# Patient Record
Sex: FEMALE | Race: WHITE | URBAN - METROPOLITAN AREA
[De-identification: names, ages, dates, MRNs, and addresses within clinical notes are randomized per-mention and may not be internally consistent; named-entity substitution may affect disease eponyms.]

---

## 2022-01-04 ENCOUNTER — TELEPHONE (OUTPATIENT)
Dept: ENT CLINIC | Age: 67
End: 2022-01-04

## 2022-01-04 NOTE — TELEPHONE ENCOUNTER
Tried to call [facility] Parents to confirm next appointment, unable to reach pt due to phone number being disconnected

## 2022-01-05 ENCOUNTER — OFFICE VISIT (OUTPATIENT)
Dept: ENT CLINIC | Age: 67
End: 2022-01-05
Payer: COMMERCIAL

## 2022-01-05 VITALS
RESPIRATION RATE: 17 BRPM | DIASTOLIC BLOOD PRESSURE: 82 MMHG | HEART RATE: 80 BPM | TEMPERATURE: 97.1 F | WEIGHT: 201 LBS | SYSTOLIC BLOOD PRESSURE: 124 MMHG | BODY MASS INDEX: 33.49 KG/M2 | OXYGEN SATURATION: 99 % | HEIGHT: 65 IN

## 2022-01-05 DIAGNOSIS — H69.83 ETD (EUSTACHIAN TUBE DYSFUNCTION), BILATERAL: ICD-10-CM

## 2022-01-05 DIAGNOSIS — I88.9 CERVICAL LYMPHADENITIS: Primary | ICD-10-CM

## 2022-01-05 DIAGNOSIS — L21.9 SEBORRHEIC DERMATITIS: ICD-10-CM

## 2022-01-05 DIAGNOSIS — J31.0 CHRONIC RHINITIS: ICD-10-CM

## 2022-01-05 PROCEDURE — 99203 OFFICE O/P NEW LOW 30 MIN: CPT | Performed by: OTOLARYNGOLOGY

## 2022-01-05 RX ORDER — MONTELUKAST SODIUM 10 MG/1
TABLET ORAL
COMMUNITY
Start: 2021-10-19

## 2022-01-05 RX ORDER — METFORMIN HYDROCHLORIDE 500 MG/1
TABLET ORAL 2 TIMES DAILY WITH MEALS
COMMUNITY

## 2022-01-05 RX ORDER — SERTRALINE HYDROCHLORIDE 100 MG/1
100 TABLET, FILM COATED ORAL DAILY
COMMUNITY
Start: 2021-11-29

## 2022-01-05 RX ORDER — LOSARTAN POTASSIUM 25 MG/1
TABLET ORAL
COMMUNITY
Start: 2021-10-19

## 2022-01-05 RX ORDER — SIMVASTATIN 40 MG/1
TABLET, FILM COATED ORAL
COMMUNITY
Start: 2021-10-19

## 2022-01-05 NOTE — PROGRESS NOTES
Subjective:    Amador Rater   61 y.o.   1955     New Patient Visit    Location -right neck    Quality -swelling, nodule    Severity -mild  Duration -around 1 year  Timing -ongoing    Context -patient states noted a palpable nodule right posterior neck possibly 1 year ago, no change no pain no fluctuation in size no obvious preceding symptoms. She does have a history of skin cancer on her scalp going back 6 to 7 years ago. She does have some itching to the posterior hairline. Modifying Features -none    Associated symptoms/signs -nasal allergies, occasional left ear fluid feeling      Review of Systems  Review of Systems   Constitutional: Negative for chills and fever. HENT: Positive for congestion. Negative for ear pain, hearing loss, nosebleeds and tinnitus. Eyes: Negative for blurred vision and double vision. Respiratory: Negative for cough, sputum production and shortness of breath. Cardiovascular: Negative for chest pain and palpitations. Gastrointestinal: Negative for heartburn, nausea and vomiting. Musculoskeletal: Negative for joint pain and neck pain. Skin: Positive for itching. Neurological: Negative for dizziness, speech change, weakness and headaches. Endo/Heme/Allergies: Positive for environmental allergies. Does not bruise/bleed easily. Psychiatric/Behavioral: Negative for memory loss. The patient does not have insomnia. Past Medical History:   Diagnosis Date    DM (diabetes mellitus) ([facility] [address]     HTN (hypertension)      Past Surgical History:   Procedure Laterality Date    HX TUBAL LIGATION      IR CHOLECYSTOSTOMY PERCUTANEOUS        History reviewed. No pertinent family history. Social History     Tobacco Use    Smoking status: Never Smoker    Smokeless tobacco: Never Used   Substance Use Topics    Alcohol use: Not on file      Prior to Admission medications    Medication Sig Start Date End Date Taking?  Authorizing Provider   losartan (COZAAR) 25 mg tablet 10/19/21  Yes Provider, Historical   montelukast (SINGULAIR) 10 mg tablet  10/19/21  Yes Provider, Historical   sertraline (ZOLOFT) 100 mg tablet Take 100 mg by mouth daily. 11/29/21  Yes Provider, Historical   simvastatin (ZOCOR) 40 mg tablet  10/19/21  Yes Provider, Historical   metFORMIN (GLUCOPHAGE) 500 mg tablet Take  by mouth two (2) times daily (with meals). Yes Provider, Historical        No Known Allergies      Objective:     Visit Vitals  /82 (BP 1 Location: Left upper arm, BP Patient Position: Sitting, BP Cuff Size: Adult)   Pulse 80   Temp 97.1 °F (36.2 °C) (Temporal)   Resp 17   Ht 5' 5\" (1.651 m)   Wt 201 lb (91.2 kg)   SpO2 99%   BMI 33.45 kg/m²        Physical Exam  Vitals reviewed. Constitutional:       General: She is awake. She is not in acute distress. Appearance: Normal appearance. She is well-groomed and normal weight. HENT:      Head: Normocephalic and atraumatic. Jaw: There is normal jaw occlusion. No trismus, tenderness or malocclusion. Salivary Glands: Right salivary gland is not diffusely enlarged or tender. Left salivary gland is not diffusely enlarged or tender. Comments: Patch of seborrheic dermatitis midline posterior nuchal hairline     Right Ear: Hearing, tympanic membrane, ear canal and external ear normal.      Left Ear: Hearing, tympanic membrane, ear canal and external ear normal.      Ears:      Fong exam findings: does not lateralize. Right Rinne: AC > BC. Left Rinne: AC > BC. Nose: Mucosal edema and rhinorrhea present. No nasal deformity or septal deviation. Rhinorrhea is clear. Right Nostril: No epistaxis. Left Nostril: No epistaxis. Right Turbinates: Enlarged. Not swollen or pale. Left Turbinates: Enlarged. Not swollen or pale. Right Sinus: No maxillary sinus tenderness or frontal sinus tenderness. Left Sinus: No maxillary sinus tenderness or frontal sinus tenderness.       Mouth/Throat:      Lips: Pink. No lesions. Mouth: Mucous membranes are moist. No oral lesions. Dentition: Normal dentition. No dental caries. Tongue: No lesions. Palate: No mass and lesions. Pharynx: Oropharynx is clear. Uvula midline. No oropharyngeal exudate or posterior oropharyngeal erythema. Tonsils: No tonsillar exudate. 0 on the right. 0 on the left. Eyes:      General: Vision grossly intact. Extraocular Movements: Extraocular movements intact. Right eye: No nystagmus. Left eye: No nystagmus. Conjunctiva/sclera: Conjunctivae normal.      Pupils: Pupils are equal, round, and reactive to light. Neck:      Thyroid: No thyroid mass, thyromegaly or thyroid tenderness. Trachea: Trachea and phonation normal. No tracheal tenderness or tracheal deviation. Comments: <1 cm mobile posterior triangle lymph node right side  Cardiovascular:      Rate and Rhythm: Normal rate and regular rhythm. Pulmonary:      Effort: Pulmonary effort is normal. No respiratory distress. Breath sounds: No stridor. Musculoskeletal:         General: No swelling or tenderness. Normal range of motion. Cervical back: No edema or erythema. Lymphadenopathy:      Cervical: Cervical adenopathy present. Right cervical: Posterior cervical adenopathy present. Skin:     General: Skin is warm and dry. Findings: No lesion or rash. Neurological:      General: No focal deficit present. Mental Status: She is alert and oriented to person, place, and time. Mental status is at baseline. Cranial Nerves: Cranial nerves are intact. Coordination: Romberg sign negative. Gait: Gait is intact. Psychiatric:         Mood and Affect: Mood normal.         Behavior: Behavior normal. Behavior is cooperative. Assessment/Plan:     Encounter Diagnoses   Name Primary?     Cervical lymphadenitis Yes    ETD (Eustachian tube dysfunction), bilateral     Chronic rhinitis     Seborrheic dermatitis      Patient has a small reactive lymph node right posterior triangle. Usually this is seen in response to dermatologic inflammation along the scalp or hairline. She does have some dermatitis of the midline nuchal hairline. No other abnormalities on head neck exam other than rhinitis. Patient is reassured, the lesion has not large enough to warrant biopsy at this point but we can keep a close eye on it. I have asked her to see me back in 6 months, call me sooner if she notices any increase in size. Orders Placed This Encounter    losartan (COZAAR) 25 mg tablet    montelukast (SINGULAIR) 10 mg tablet    sertraline (ZOLOFT) 100 mg tablet    simvastatin (ZOCOR) 40 mg tablet    metFORMIN (GLUCOPHAGE) 500 mg tablet     Follow-up and Dispositions    · Return in about 6 months (around 7/5/2022). Thank you for referring this patient,    Tejas H. Claudetta Cedars, MD, [address] [facility]    [address]  [facility]    [address] [address]  [place], [place] [address] [place] [address]

## 2022-01-05 NOTE — LETTER
1/5/2022    Patient: Abigail Gonsales   YOB: 1955   Date of Visit: 1/5/2022     Leonel Apple MD  [address] [place] [id number]  Via Fax: [phone]     [facility] [place] [address]  [address] [facility]  [phone]  Via Fax: [phone]    Dear Leonel Apple MD  Dermatology [place] [address],      Thank you for referring Ms. Abigail Gonsales to [facility] [address], THROAT AND ALLERGY [facility] for evaluation. My notes for this consultation are attached. If you have questions, please do not hesitate to call me. I look forward to following your patient along with you.       Sincerely,    Simon Arias MD

## 2022-01-05 NOTE — PROGRESS NOTES
Visit Vitals  /82 (BP 1 Location: Left upper arm, BP Patient Position: Sitting, BP Cuff Size: Adult)   Pulse 80   Temp 97.1 °F (36.2 °C) (Temporal)   Resp 17   Ht 5' 5\" (1.651 m)   Wt 201 lb (91.2 kg)   SpO2 99%   BMI 33.45 kg/m²     Chief Complaint   Patient presents with    New Patient     neck nodule right side